# Patient Record
Sex: MALE | Race: WHITE | URBAN - METROPOLITAN AREA
[De-identification: names, ages, dates, MRNs, and addresses within clinical notes are randomized per-mention and may not be internally consistent; named-entity substitution may affect disease eponyms.]

---

## 2023-03-02 ENCOUNTER — OFFICE (OUTPATIENT)
Dept: URBAN - METROPOLITAN AREA CLINIC 104 | Facility: CLINIC | Age: 68
Setting detail: OPHTHALMOLOGY
End: 2023-03-02
Payer: COMMERCIAL

## 2023-03-02 VITALS — HEIGHT: 60 IN

## 2023-03-02 DIAGNOSIS — H40.1121: ICD-10-CM

## 2023-03-02 DIAGNOSIS — H01.004: ICD-10-CM

## 2023-03-02 DIAGNOSIS — H25.13: ICD-10-CM

## 2023-03-02 DIAGNOSIS — H35.3131: ICD-10-CM

## 2023-03-02 DIAGNOSIS — H35.54: ICD-10-CM

## 2023-03-02 DIAGNOSIS — H01.001: ICD-10-CM

## 2023-03-02 PROCEDURE — 92134 CPTRZ OPH DX IMG PST SGM RTA: CPT | Performed by: OPTOMETRIST

## 2023-03-02 PROCEDURE — 92014 COMPRE OPH EXAM EST PT 1/>: CPT | Performed by: OPTOMETRIST

## 2023-03-02 PROCEDURE — 92083 EXTENDED VISUAL FIELD XM: CPT | Performed by: OPTOMETRIST

## 2023-03-02 ASSESSMENT — TONOMETRY
OS_IOP_MMHG: 16
OD_IOP_MMHG: 16

## 2023-03-02 ASSESSMENT — KERATOMETRY
OS_K2POWER_DIOPTERS: 44.88
OS_K1POWER_DIOPTERS: 44.06
OD_AXISANGLE_DEGREES: 025
OD_K1POWER_DIOPTERS: 44.06
OS_AXISANGLE_DEGREES: 071
OD_K2POWER_DIOPTERS: 44.35

## 2023-03-02 ASSESSMENT — PACHYMETRY
OD_CT_UM: 530
OS_CT_UM: 545
OD_CT_CORRECTION: 1
OS_CT_CORRECTION: 0

## 2023-03-02 ASSESSMENT — LID EXAM ASSESSMENTS
OS_BLEPHARITIS: LUL 2+
OD_BLEPHARITIS: RUL 2+

## 2023-03-02 ASSESSMENT — REFRACTION_AUTOREFRACTION
OS_CYLINDER: -0.50
OD_CYLINDER: -1.00
OS_SPHERE: -1.25
OD_SPHERE: -0.75
OS_AXIS: 129
OD_AXIS: 090

## 2023-03-02 ASSESSMENT — AXIALLENGTH_DERIVED
OD_AL: 23.8222
OS_AL: 23.8222

## 2023-03-02 ASSESSMENT — REFRACTION_CURRENTRX
OS_CYLINDER: SPH
OS_VPRISM_DIRECTION: SV
OS_VPRISM_DIRECTION: SV
OD_CYLINDER: -0.50
OS_SPHERE: -1.00
OS_AXIS: 169
OS_SPHERE: +1.50
OD_SPHERE: -0.50
OS_OVR_VA: 20/
OD_VPRISM_DIRECTION: SV
OD_VPRISM_DIRECTION: SV
OD_AXIS: 152
OS_OVR_VA: 20/
OD_SPHERE: -0.50
OS_AXIS: 0
OD_SPHERE: +1.75
OS_CYLINDER: -0.25
OS_OVR_VA: 20/
OS_CYLINDER: SPH
OD_CYLINDER: -0.50
OD_OVR_VA: 20/
OD_CYLINDER: -0.50
OS_VPRISM_DIRECTION: SV
OD_OVR_VA: 20/
OD_AXIS: 144
OS_SPHERE: -0.75
OS_AXIS: 0
OD_OVR_VA: 20/
OD_VPRISM_DIRECTION: SV
OD_AXIS: 140

## 2023-03-02 ASSESSMENT — VISUAL ACUITY
OS_BCVA: 20/30-1
OD_BCVA: 20/50-2

## 2023-03-02 ASSESSMENT — SPHEQUIV_DERIVED
OS_SPHEQUIV: -1.5
OD_SPHEQUIV: -1.25

## 2023-03-14 DIAGNOSIS — M25.562 PAIN IN LEFT KNEE: ICD-10-CM

## 2023-03-14 PROBLEM — Z00.00 ENCOUNTER FOR PREVENTIVE HEALTH EXAMINATION: Status: ACTIVE | Noted: 2023-03-14

## 2023-03-17 ENCOUNTER — APPOINTMENT (OUTPATIENT)
Dept: ORTHOPEDIC SURGERY | Facility: CLINIC | Age: 68
End: 2023-03-17
Payer: MEDICARE

## 2023-03-17 ENCOUNTER — NON-APPOINTMENT (OUTPATIENT)
Age: 68
End: 2023-03-17

## 2023-03-17 VITALS — HEIGHT: 74 IN | BODY MASS INDEX: 28.23 KG/M2 | WEIGHT: 220 LBS

## 2023-03-17 DIAGNOSIS — M17.12 UNILATERAL PRIMARY OSTEOARTHRITIS, LEFT KNEE: ICD-10-CM

## 2023-03-17 PROCEDURE — 20610 DRAIN/INJ JOINT/BURSA W/O US: CPT | Mod: LT

## 2023-03-17 PROCEDURE — 99204 OFFICE O/P NEW MOD 45 MIN: CPT | Mod: 25

## 2023-03-17 PROCEDURE — 73564 X-RAY EXAM KNEE 4 OR MORE: CPT | Mod: LT

## 2023-03-17 RX ORDER — MELOXICAM 15 MG/1
15 TABLET ORAL DAILY
Qty: 30 | Refills: 0 | Status: ACTIVE | COMMUNITY
Start: 2023-03-17 | End: 1900-01-01

## 2023-03-17 NOTE — ADDENDUM
[FreeTextEntry1] : This note was written by ANALI DAVIES on 03/17/2023 acting as scribe for Dr. Bao Carias M.D.\par \par I, Dr. Bao Carias, have read and attest that all the information, medical decision making and discharge instructions within are true and accurate. \par \par This note was written by Tani Florentino on 03/17/2023 acting as scribe for Dr. Bao Carias M.D.\par \par ANGELINE, Dr. Bao Carias, have read and attest that all the information, medical decision making and discharge instructions within are true and accurate.

## 2023-03-17 NOTE — DISCUSSION/SUMMARY
[de-identified] : Patient's LEFT knee symptoms are secondary to degenerative arthritis. While I believe he would eventually benefit from a left TKR, surgery is not warranted at this point. Therefore, we will continue nonoperatively. Patient was given a left knee cortisone injection as detailed above for symptomatic relief. We will seek authorization for left knee viscosupplementation injections. Once we have authorization, we will proceed accordingly. I gave the patient a prescription for Meloxicam 15 QD and a script for PT. \par \par Patient can continue home exercises, weight management and activities as tolerated. All questions were answered, understanding verbalized. Patient is in agreement with plan of treatment.

## 2023-03-17 NOTE — PROCEDURE
[de-identified] : LEFT KNEE CORTISONE INJECTION\par \par Discussed at length with the patient the planned steroid and lidocaine injection. The risks, benefits, convalescence and alternatives were reviewed. The possible side effects discussed included but were not limited to: pain, swelling, heat and redness. These symptoms are generally mild but if they are extensive then contact the office. Giving pain relievers by mouth such as NSAID’s or Tylenol can generally treat the reactions to steroid and lidocaine. Rare cases of infection have been noted. Rash, hives and itching may occur post injection. If you have muscle pain or cramps, flushing and or swelling of the face, rapid heart beat, nausea, dizziness, fever, chills, headache, difficulty breathing, swelling in the arms or legs, or have a prickly feeling of your skin, contact a health care provider immediately.\par \par Following this discussion, the knee was prepped with betadine and under sterile conditions 5 cc of 2% lidocaine and 1 cc depo-medrol (40mg) were injected with a 21 gauge needle. The needle was introduced into the joint, aspiration was performed to ensure intra-articular placement and the medication was injected. Upon withdrawal of the needle the site was cleaned with alcohol and a bandaid applied. The patient tolerated the injection well and there were no adverse effects. Post injection instructions included no strenuous activity for 24 hours, cryotherapy and if there are any adverse effects to contact the office.

## 2023-03-17 NOTE — HISTORY OF PRESENT ILLNESS
[de-identified] : Anastacio Prabhakar is a 67 year old male who presents for initial evaluation of  left knee pain for 1 year, with no injury. Notes the pain is diffuse about the knee, dull in nature with no swelling or mechanical symptoms. Reports pain walking up the stairs. Takes Aleve for the pain, which helps him to sleep. Pt received cortisone 1 year ago, which helped.

## 2023-03-17 NOTE — PHYSICAL EXAM
[de-identified] : General appearance: well nourished and hydrated, pleasant, alert and oriented x 3, cooperative.\par HEENT: Normocephalic, EOM intact, Nasal septum midline, Oral cavity clear, External auditory canal clear.\par Cardiovascular: no apparent abnormalities, no lower leg edema, no varicosities, pedal pulses are palpable.\par Lymphatics Lymph nodes: none palpated, Lymphedema: not present.\par Neurologic: sensation is normal, no muscle weakness in upper or lower extremities, patella tendon reflexes intact .\par Dermatologic no apparent skin lesions, moist, warm, no rash.\par Spine:cervical spine appears normal and moves freely, thoracic spine appears normal and moves freely, lumbosacral spine appears normal and moves freely. Tenderness over greater trochanter. \par Gait: nonantalgic.\par \par Left knee\par Inspection: no effusion or erythema.\par Wounds: none.\par Alignment: normal.\par Palpation: lateral joint line tenderness on palpation.\par ROM active (in degrees): 0-125 with crepitus\par Ligamentous laxity: all ligaments appear stable,, negative ant. drawer test, negative post. drawer test, stable to varus stress test, stable to valgus stress test. negative Lachman's test, negative pivot shift test\par Meniscal Test: negative McMurrays, negative Laz.\par Patellofemoral Alignment Test: Q angle-, normal.\par Muscle Test: good quad strength.\par Leg examination: calf is soft and non-tender.\par \par Right knee\par Inspection: no effusion or erythema.\par Wounds: none.\par Alignment: normal.\par Palpation: no specific tenderness on palpation.\par ROM active (in degrees): 0-125 with crepitus through arc of motion\par Ligamentous laxity: all ligaments appear stable,, negative ant. drawer test, negative post. drawer test, stable to varus stress test, stable to valgus stress test. negative Lachman's test, negative pivot shift test\par Meniscal Test: negative McMurrays, negative Laz.\par Patellofemoral Alignment Test: Q angle-, normal.\par Muscle Test: good quad strength.\par Leg examination: calf is soft and non-tender.\par \par Left hip\par Inspection: No swelling or ecchymosis.\par Wounds: none.\par Palpation: non-tender.\par Stability: no instability.\par Strength: 5/5 all motor groups.\par ROM: no pain with FROM.\par Leg length: equal.\par \par Right hip\par Inspection: No swelling or ecchymosis.\par Wounds: none.\par Palpation: non-tender.\par Stability: no instability.\par Strength: 5/5 all motor groups.\par ROM: no pain with FROM.\par Leg length: equal.\par \par Left ankle\par Inspection: no erythema noted, no swelling noted.\par Palpation: no pain on palpation .\par ROM: FROM without crepitus.\par Muscle strength: 5/5.\par Stability: no instability noted.\par \par Right ankle\par Inspection: no erythema noted, no swelling noted.\par ROM: FROM without crepitus.\par Palpation: no pain on palpation .\par Muscle strength: 5/5.\par Stability: no instability noted.\par \par Left foot\par Inspection: color, texture and turgor are normal. mild pes planus. \par ROM: full range of motion of all joints without pain or crepitus.\par Palpation: no tenderness.\par Stability: no instability noted.\par \par Right foot\par Inspection: color, texture and turgor are normal. mild pes planus.\par ROM: full range of motion of all joints without pain or crepitus.\par Palpation: no tenderness.\par Stability: no instability noted.\par \par Left shoulder\par Inspection: no muscle asymmetry, no atrophy.\par Palpation: no tenderness noted, ACJ non-tender.\par ROM: full active ROM, full passive ROM.\par Strength testing): anterior deltoid, supraspinatus, infraspinatus, subscapularis all 5/5.\par Stability test: ant. apprehension negative, post. apprehension negative, relocation test negative.\par Impingement Test: negative NEER.\par \par Right shoulder\par Inspection: no muscle asymmetry, no atrophy.\par Palpation: no tenderness noted, ACJ non-tender.\par ROM: full active ROM, full passive ROM.\par Strength testing): anterior deltoid, supraspinatus, infraspinatus, subscapularis all 5/5.\par Stability test: ant. apprehension negative, post. apprehension negative, relocation test negative.\par Impingement Test: negative NEER.\par Surgical Wounds: none.\par \par Left elbow\par Inspection: negative swelling.\par Wounds: none.\par Palpation: non-tender.\par ROM: full ROM.\par Strength: 5/5 all groups.\par Stability: no instability.\par Mass: none.\par \par Right elbow\par Inspection: negative swelling.\par Wounds: none.\par Palpation: non-tender.\par ROM: full ROM.\par Strength: 5/5 all groups.\par Stability: no instability.\par Mass: none.\par \par Left wrist\par Inspection: negative swelling.\par Wound: none.\par Palpation (bone): no tenderness.\par ROM: full ROM.\par Strength: full , good.\par \par Right wrist\par Inspection: negative swelling.\par Wound: none.\par Palpation (bone): no tenderness.\par ROM: full ROM.\par Strength: full , good.\par \par Left hand Inspection: no skin changes, normal appearance.Wounds: none.Strength: full , able to make full fist.Sensation: light touch intact all fingers and thumb.Vascular: good capillary refill < 3 seconds, all fingers and thumb.Mass: none.\par \par Right hand Inspection: no skin changes, normal appearance.Wounds: none.Strength: full , able to make full fist.Sensation: light touch intact all fingers and thumb.Vascular: good capillary refill < 3 seconds, all fingers and thumb. Mass: none.  [de-identified] : Left knee xrays, standing AP/Lateral and Merchant films, and 45 degree PA standing view, taken at the office today shows diffuse tricompartmental degenerative arthritis, lateral joint space narrowing, marginal osteophytes, bone on bone with sclerosis, especially on the Vogel view patellofemoral joint space narrowing, small peripheral osteophytes, Kellgren and Reese grade 3-4.\par \par Right knee xray merchant view taken at the office today demonstrates joint space narrowing, marginal osteophytes, sclerosis consistent with patellofemoral arthritis.